# Patient Record
Sex: MALE | Race: WHITE | NOT HISPANIC OR LATINO | Employment: FULL TIME | URBAN - METROPOLITAN AREA
[De-identification: names, ages, dates, MRNs, and addresses within clinical notes are randomized per-mention and may not be internally consistent; named-entity substitution may affect disease eponyms.]

---

## 2018-05-07 ENCOUNTER — OFFICE VISIT (OUTPATIENT)
Dept: FAMILY MEDICINE CLINIC | Facility: CLINIC | Age: 43
End: 2018-05-07
Payer: COMMERCIAL

## 2018-05-07 VITALS
TEMPERATURE: 99 F | DIASTOLIC BLOOD PRESSURE: 80 MMHG | BODY MASS INDEX: 32.93 KG/M2 | SYSTOLIC BLOOD PRESSURE: 130 MMHG | RESPIRATION RATE: 17 BRPM | OXYGEN SATURATION: 96 % | WEIGHT: 248.5 LBS | HEIGHT: 73 IN | HEART RATE: 122 BPM

## 2018-05-07 DIAGNOSIS — G89.29 CHRONIC MIDLINE LOW BACK PAIN WITH SCIATICA, SCIATICA LATERALITY UNSPECIFIED: Primary | ICD-10-CM

## 2018-05-07 DIAGNOSIS — M54.40 CHRONIC MIDLINE LOW BACK PAIN WITH SCIATICA, SCIATICA LATERALITY UNSPECIFIED: Primary | ICD-10-CM

## 2018-05-07 PROCEDURE — 99203 OFFICE O/P NEW LOW 30 MIN: CPT | Performed by: FAMILY MEDICINE

## 2018-05-07 RX ORDER — CYCLOBENZAPRINE HCL 10 MG
10 TABLET ORAL 3 TIMES DAILY PRN
Qty: 40 TABLET | Refills: 0 | Status: SHIPPED | OUTPATIENT
Start: 2018-05-07

## 2018-05-07 NOTE — PROGRESS NOTES
Assessment/Plan:   1  Chronic midline low back pain with sciatica, sciatica laterality unspecified  Symptoms today appear likely secondary to acute musculoskeletal strain/spasm  He has had this multiple times in the past   He was educated on the pathophysiology is problem  At this time there are no red flags indicating disc herniation/spinal problem  Continue with supportive care  May apply heating pad to this area  Elevate legs at nighttime  Start treatment with ibuprofen 600 milligrams q 6 hours for the next 2 days straight  He was also given a prescription for Flexeril to take at bedtime  He was advised that he may benefit greatly from seeing physical therapy  He was advised to think of this  If symptoms are persisting, consider further evaluation  - cyclobenzaprine (FLEXERIL) 10 mg tablet; Take 1 tablet (10 mg total) by mouth 3 (three) times a day as needed for muscle spasms  Dispense: 40 tablet; Refill: 0     There are no diagnoses linked to this encounter  Subjective:    Chief Complaint   Patient presents with    Back Pain     mid back         Patient ID: Syed Taylor is a 36 y o  male  Patient is a 24-year-old male presents today as a new patient with a CC of moderate to severe lower back pain  He states the symptoms started 3 days ago  He states that he was twisting when this happened  He denies any significant trauma  He states that he has been having numbness in his toes however denies specific radiation of the pain  Denies bowel bladder incontinence he also denies saddle paresthesia  His symptoms are periodic  He occasionally develops a severe spasm in his lower back  He rates this as a 7/10  He has been trying heating pad with minimal relief  He states that he did have a similar injury approximately 7 years ago  He was seen at [address]  [facility] and was advised that his symptoms were mostly muscular          Review of Systems   Constitutional: Negative for activity change, chills, fatigue and fever  HENT: Negative for congestion, ear pain, sinus pressure and sore throat  Eyes: Negative for redness, itching and visual disturbance  Respiratory: Negative for cough and shortness of breath  Cardiovascular: Negative for chest pain and palpitations  Gastrointestinal: Negative for abdominal pain, diarrhea and nausea  Endocrine: Negative for cold intolerance and heat intolerance  Genitourinary: Negative for dysuria, flank pain and frequency  Musculoskeletal: Negative for arthralgias, back pain, gait problem and myalgias  Skin: Negative for color change  Allergic/Immunologic: Negative for environmental allergies  Neurological: Negative for dizziness, numbness and headaches  Psychiatric/Behavioral: Negative for behavioral problems and sleep disturbance  The following portions of the patient's history were reviewed and updated as appropriate : past family history, past medical history, past social history and past surgical history  Objective:    Vitals:    05/07/18 1116   BP: 130/80   BP Location: Right arm   Patient Position: Sitting   Cuff Size: Adult   Pulse: (!) 122   Resp: 17   Temp: 99 °F (37 2 °C)   TempSrc: Tympanic   SpO2: 96%   Weight: 113 kg (248 lb 8 oz)   Height: 6' 1 39" (1 864 m)        Physical Exam   Constitutional: He is oriented to person, place, and time  He appears well-developed and well-nourished  HENT:   Head: Normocephalic and atraumatic  Nose: Nose normal    Mouth/Throat: No oropharyngeal exudate  Eyes: Pupils are equal, round, and reactive to light  Right eye exhibits no discharge  Left eye exhibits no discharge  Neck: Normal range of motion  Neck supple  No tracheal deviation present  Cardiovascular: Normal rate, regular rhythm and intact distal pulses  Exam reveals no gallop and no friction rub  No murmur heard  Pulses:       Dorsalis pedis pulses are 2+ on the right side, and 2+ on the left side          Posterior tibial pulses are 2+ on the right side, and 2+ on the left side  Pulmonary/Chest: Effort normal  No respiratory distress  Abdominal: Soft  There is no guarding  Musculoskeletal: He exhibits no edema  Lumbar back: He exhibits decreased range of motion, swelling, pain and spasm  Lymphadenopathy:        Head (right side): No submental and no submandibular adenopathy present  Head (left side): No submental and no submandibular adenopathy present  He has no cervical adenopathy  Right cervical: No superficial cervical, no deep cervical and no posterior cervical adenopathy present  Left cervical: No superficial cervical, no deep cervical and no posterior cervical adenopathy present  Neurological: He is alert and oriented to person, place, and time  No cranial nerve deficit or sensory deficit  Skin: Skin is warm, dry and intact  Psychiatric: His speech is normal and behavior is normal  Judgment normal  His mood appears not anxious  Cognition and memory are normal  He does not exhibit a depressed mood  Vitals reviewed